# Patient Record
Sex: FEMALE | Race: ASIAN
[De-identification: names, ages, dates, MRNs, and addresses within clinical notes are randomized per-mention and may not be internally consistent; named-entity substitution may affect disease eponyms.]

---

## 2018-04-29 ENCOUNTER — HOSPITAL ENCOUNTER (EMERGENCY)
Dept: HOSPITAL 68 - ERH | Age: 48
End: 2018-04-29
Payer: COMMERCIAL

## 2018-04-29 VITALS — HEIGHT: 62 IN | WEIGHT: 100 LBS | BODY MASS INDEX: 18.4 KG/M2

## 2018-04-29 VITALS — SYSTOLIC BLOOD PRESSURE: 130 MMHG | DIASTOLIC BLOOD PRESSURE: 68 MMHG

## 2018-04-29 DIAGNOSIS — D25.9: Primary | ICD-10-CM

## 2018-04-29 LAB
ABSOLUTE GRANULOCYTE CT: 7.8 /CUMM (ref 1.4–6.5)
BASOPHILS # BLD: 0 /CUMM (ref 0–0.2)
BASOPHILS NFR BLD: 0.3 % (ref 0–2)
EOSINOPHIL # BLD: 0.1 /CUMM (ref 0–0.7)
EOSINOPHIL NFR BLD: 0.8 % (ref 0–5)
ERYTHROCYTE [DISTWIDTH] IN BLOOD BY AUTOMATED COUNT: 18.3 % (ref 11.5–14.5)
GRANULOCYTES NFR BLD: 84.5 % (ref 42.2–75.2)
HCT VFR BLD CALC: 34.2 % (ref 37–47)
LYMPHOCYTES # BLD: 0.8 /CUMM (ref 1.2–3.4)
MCH RBC QN AUTO: 25.8 PG (ref 27–31)
MCHC RBC AUTO-ENTMCNC: 33 G/DL (ref 33–37)
MCV RBC AUTO: 78.1 FL (ref 81–99)
MONOCYTES # BLD: 0.5 /CUMM (ref 0.1–0.6)
PLATELET # BLD: 208 /CUMM (ref 130–400)
PMV BLD AUTO: 9.2 FL (ref 7.4–10.4)
RED BLOOD CELL CT: 4.37 /CUMM (ref 4.2–5.4)
WBC # BLD AUTO: 9.2 /CUMM (ref 4.8–10.8)

## 2018-04-29 NOTE — CT SCAN REPORT
EXAMINATION:
CT ABDOMEN AND PELVIS WITH CONTRAST
 
CLINICAL INFORMATION:
Suprapubic abdominal pain for one day.
 
COMPARISON:
None
 
TECHNIQUE:
Multidetector volumetric imaging was performed of the abdomen and pelvis
following IV administration of 95 mL of Optiray 320 intravenous contrast.
Sagittal and coronal reformatted images were obtained on the technologist's
workstation.
 
DLP:
266 mGy-cm
 
FINDINGS:
 
LUNG BASES: No acute findings in the visualized lung bases. Incidentally
noted is small pericardial effusion. No pleural effusion.
 
LIVER, GALLBLADDER, AND BILIARY TREE: Unremarkable.
 
PANCREAS: Unremarkable.
 
SPLEEN: Unremarkable.
 
ADRENAL GLANDS: Unremarkable.
 
KIDNEYS AND URETERS: Unremarkable. No renal mass, nephrolithiasis,
hydronephrosis or perinephric edema.
 
BLADDER: Urinary bladder is compressed by the enlarged uterus. No evidence of
bladder wall thickening or bladder calculi.
 
GASTROINTESTINAL TRACT: Stomach is grossly normal. Bowel loops are normal in
caliber. Appendix is normal. No evidence of acute inflammation or obstruction
along the gastrointestinal tract. No abdominal ascites or pneumoperitoneum.
 
ABDOMINAL WALL: No abdominal wall mass or hernia. However, there is bulging
of the lower abdominal wall due to mass effect caused by the enlarged uterus.
 
 
LYMPH NODES: Normal.
 
VASCULAR: Abdominal aorta is normal in caliber. The celiac trunk, SMA, JESSEE
and renal arteries are widely patent. Inferior vena cava is normal.
 
PELVIC VISCERA: The cervix is unremarkable. The anteflexed uterus measures
approximately 14 cm long, 10.4 cm AP and 11 cm transverse. A heterogeneous,
predominantly cellular, lobulated mass of the anterior uterine body and
fundus measures approximately 10.8 x 9.2 x 9.8 cm; it exerts mass effect upon
the endometrium which is posteriorly displaced. Small amount of free fluid is
present in the pelvic cul-de-sac. There is a 2.4 cm simple cyst of the left
adnexa.
 
OSSEOUS STRUCTURES: Unremarkable.
 
IMPRESSION:
 
1. No acute imaging findings within the abdomen or pelvis.
2. The uterus is enlarged due to presence of a lobulated, cellular mass in
its body and fundus. This could represent a large leiomyoma. However, a
leiomyoma variant, such as a highly cellular, atypical or smooth muscle tumor
of uncertain malignant potential should be considered. Consultation with
gynecology service is recommended.
3. No abdominal wall mass or hernia.

## 2018-06-19 NOTE — HISTORY & PHYSICAL PRE-OP
General Information and HPI
History of Present Illness:
This patient is a 47-year-old female with known fibroid uterus with 
dysfunctional uterine bleeding.
 
Allergies/Medications
Allergies:
Coded Allergies:
No Known Allergies (06/18/18)
 
Home Med list
No Known Home Medications
 
 
Past History
 
Medical History
Neurological: NONE
EENT: NONE
Cardiovascular: NONE
Respiratory: NONE
Gastrointestinal: NONE
Hepatic: NONE
Renal: NONE
Musculoskeletal: NONE
Psychiatric: NONE
Endocrine: NONE
 
Surgical History
Pertinent Surgical History: no abdominal surgeries
 
Review of Systems
 
Review of Systems
Constitutional:
Reports: no symptoms. 
EENTM:
Reports: no symptoms. 
Cardiovascular:
Reports: no symptoms. 
Respiratory:
Reports: no symptoms. 
GI:
Reports: no symptoms. 
Genitourinary:
Reports: see HPI. 
Musculoskeletal:
Reports: no symptoms. 
Skin:
Reports: no symptoms. 
Neurological/Psychological:
Reports: no symptoms. 
Hematologic/Endocrine:
Reports: no symptoms. 
Immunologic/Allergic:
Reports: no symptoms. 
All Other Systems: Reviewed and Negative
 
Exam & Diagnostic Data
Physical Exam:
HEENT: Normocephalic atraumatic
Chest: Clear to auscultation
Cardiovascular: Normal S1-S2
Abdomen: Lower abdominal mass out
Pelvic: Deferred OR
Extremities: No clubbing cyanosis or edema
 
Assessment/Plan
Assessment/Plan:
Dysfunctional uterine bleeding secondary to fibroid uterus
Plan: D&C hysteroscopy
 
As Ranked By This Provider
Problem List:
 1. Dysfunctional uterine bleeding

## 2018-06-20 ENCOUNTER — HOSPITAL ENCOUNTER (OUTPATIENT)
Dept: HOSPITAL 68 - STS | Age: 48
End: 2018-06-20
Attending: OBSTETRICS & GYNECOLOGY
Payer: COMMERCIAL

## 2018-06-20 VITALS — HEIGHT: 64 IN | WEIGHT: 95 LBS | BODY MASS INDEX: 16.22 KG/M2

## 2018-06-20 DIAGNOSIS — N92.0: Primary | ICD-10-CM

## 2018-06-20 DIAGNOSIS — D25.9: ICD-10-CM

## 2018-06-20 DIAGNOSIS — N93.8: ICD-10-CM

## 2018-06-28 NOTE — OPERATIVE REPORT
Operative/Inv Procedure Report
Surgery Date: 06/20/18
Name of Procedure:
D&C hysteroscopy
Pre-Operative Diagnosis:
Menorrhagia
Post-Operative Diagnosis:
Same
Estimated Blood Loss: scant
Surgeon/Assistant:
Efrain Choudhary MD
 
Anesthesia: moderate sedation
 
Operative/Procedure Note
Note:
The patient is brought to the operating room placed on the OR table in the 
dorsal supine position.  She was given adequate anesthesia and repositioned in 
modified dorsal lithotomy.  She was prepped and draped in usual sterile fashion.
 The vagina was very tight and a weighted speculum could not be placed into the 
vagina and therefore a narrow Cassoday retractor was used for retraction.  A 
single-tooth tenaculum is attached eventually the cervix.  Cervix was injected 
with 1% lidocaine.  Examination under anesthesia revealed a 14 week size uterus.
 An endocervical curettage was performed revealing a small amount of tissue.  
Uterus was sounded to 9 cm and the cervix was serially dilated.  Hysteroscope 
was placed into the uterus and saline infusion was activated.  fibroids were 
noted in the endometrial cavity.  Hysteroscope was removed and the cervix was 
further dilated.  Sharp curettage followed revealing a moderate amount of 
tissue.  At the end of the procedure the instrument was removed and hemostasis 
was verified.  The patient was awakened and sent to recovery in good condition.
 
All needle, sponge, and instrument counts were correct at the end of the 
procedure 2.